# Patient Record
Sex: MALE | Race: BLACK OR AFRICAN AMERICAN | NOT HISPANIC OR LATINO | Employment: OTHER | URBAN - METROPOLITAN AREA
[De-identification: names, ages, dates, MRNs, and addresses within clinical notes are randomized per-mention and may not be internally consistent; named-entity substitution may affect disease eponyms.]

---

## 2024-05-21 ENCOUNTER — CLINICAL SUPPORT (OUTPATIENT)
Dept: RESPIRATORY THERAPY | Facility: HOSPITAL | Age: 64
End: 2024-05-21
Attending: NURSE PRACTITIONER
Payer: MEDICARE

## 2024-05-21 ENCOUNTER — HOSPITAL ENCOUNTER (OUTPATIENT)
Dept: RADIOLOGY | Facility: HOSPITAL | Age: 64
Discharge: HOME | End: 2024-05-21
Attending: NURSE PRACTITIONER
Payer: MEDICARE

## 2024-05-21 DIAGNOSIS — Z13.6 SCREENING FOR CARDIOVASCULAR CONDITION: ICD-10-CM

## 2024-05-21 DIAGNOSIS — Z13.6 SCREENING FOR CARDIOVASCULAR CONDITION: Primary | ICD-10-CM

## 2024-05-21 DIAGNOSIS — Z72.0 TOBACCO ABUSE: ICD-10-CM

## 2024-05-21 DIAGNOSIS — R09.89 DECREASED DORSALIS PEDIS PULSE: Primary | ICD-10-CM

## 2024-05-21 LAB
OHS QRS DURATION: 84 MS
OHS QTC CALCULATION: 425 MS

## 2024-05-21 PROCEDURE — 71046 X-RAY EXAM CHEST 2 VIEWS: CPT | Mod: TC

## 2024-05-21 PROCEDURE — 93005 ELECTROCARDIOGRAM TRACING: CPT

## 2024-05-21 PROCEDURE — 93010 ELECTROCARDIOGRAM REPORT: CPT | Mod: ,,, | Performed by: INTERNAL MEDICINE

## 2024-05-22 ENCOUNTER — HOSPITAL ENCOUNTER (OUTPATIENT)
Dept: RADIOLOGY | Facility: HOSPITAL | Age: 64
Discharge: HOME | End: 2024-05-22
Attending: NURSE PRACTITIONER
Payer: MEDICARE

## 2024-05-22 DIAGNOSIS — R09.89 DECREASED DORSALIS PEDIS PULSE: ICD-10-CM

## 2024-05-22 PROCEDURE — 93925 LOWER EXTREMITY STUDY: CPT | Mod: TC

## 2024-05-31 ENCOUNTER — LAB REQUISITION (OUTPATIENT)
Dept: LAB | Facility: HOSPITAL | Age: 64
End: 2024-05-31
Payer: MEDICARE

## 2024-05-31 DIAGNOSIS — R97.20 ELEVATED PROSTATE SPECIFIC ANTIGEN (PSA): ICD-10-CM

## 2024-05-31 LAB — PSA SERPL-MCNC: 85.45 NG/ML

## 2024-05-31 PROCEDURE — 84153 ASSAY OF PSA TOTAL: CPT

## 2024-08-30 ENCOUNTER — HOSPITAL ENCOUNTER (EMERGENCY)
Facility: HOSPITAL | Age: 64
Discharge: HOME | End: 2024-08-30
Attending: EMERGENCY MEDICINE
Payer: MEDICARE

## 2024-08-30 VITALS
HEIGHT: 70 IN | TEMPERATURE: 97 F | OXYGEN SATURATION: 95 % | BODY MASS INDEX: 16.46 KG/M2 | DIASTOLIC BLOOD PRESSURE: 82 MMHG | SYSTOLIC BLOOD PRESSURE: 137 MMHG | WEIGHT: 115 LBS | HEART RATE: 70 BPM | RESPIRATION RATE: 16 BRPM

## 2024-08-30 DIAGNOSIS — L81.9 DISCOLORATION OF SKIN OF TOE: ICD-10-CM

## 2024-08-30 DIAGNOSIS — M79.606 LEG PAIN: ICD-10-CM

## 2024-08-30 DIAGNOSIS — I73.9 PAD (PERIPHERAL ARTERY DISEASE): Primary | ICD-10-CM

## 2024-08-30 LAB
ALBUMIN SERPL-MCNC: 3.5 G/DL (ref 3.4–4.8)
ALBUMIN/GLOB SERPL: 0.9 RATIO (ref 1.1–2)
ALP SERPL-CCNC: 103 UNIT/L (ref 40–150)
ALT SERPL-CCNC: 11 UNIT/L (ref 0–55)
ANION GAP SERPL CALC-SCNC: 8 MEQ/L
APTT PPP: 29.5 SECONDS (ref 23.2–33.7)
AST SERPL-CCNC: 24 UNIT/L (ref 5–34)
BASOPHILS # BLD AUTO: 0.09 X10(3)/MCL
BASOPHILS NFR BLD AUTO: 1.2 %
BILIRUB SERPL-MCNC: 0.3 MG/DL
BUN SERPL-MCNC: 4.6 MG/DL (ref 8.4–25.7)
CALCIUM SERPL-MCNC: 8.8 MG/DL (ref 8.8–10)
CHLORIDE SERPL-SCNC: 99 MMOL/L (ref 98–107)
CO2 SERPL-SCNC: 22 MMOL/L (ref 23–31)
CREAT SERPL-MCNC: 0.86 MG/DL (ref 0.73–1.18)
CREAT/UREA NIT SERPL: 5
EOSINOPHIL # BLD AUTO: 0.6 X10(3)/MCL (ref 0–0.9)
EOSINOPHIL NFR BLD AUTO: 7.9 %
ERYTHROCYTE [DISTWIDTH] IN BLOOD BY AUTOMATED COUNT: 14.1 % (ref 11.5–17)
GFR SERPLBLD CREATININE-BSD FMLA CKD-EPI: >60 ML/MIN/1.73/M2
GLOBULIN SER-MCNC: 3.7 GM/DL (ref 2.4–3.5)
GLUCOSE SERPL-MCNC: 58 MG/DL (ref 82–115)
HCT VFR BLD AUTO: 39.3 % (ref 42–52)
HGB BLD-MCNC: 13.3 G/DL (ref 14–18)
IMM GRANULOCYTES # BLD AUTO: 0.05 X10(3)/MCL (ref 0–0.04)
IMM GRANULOCYTES NFR BLD AUTO: 0.7 %
INR PPP: 1
LYMPHOCYTES # BLD AUTO: 2.28 X10(3)/MCL (ref 0.6–4.6)
LYMPHOCYTES NFR BLD AUTO: 30.1 %
MCH RBC QN AUTO: 27.3 PG (ref 27–31)
MCHC RBC AUTO-ENTMCNC: 33.8 G/DL (ref 33–36)
MCV RBC AUTO: 80.7 FL (ref 80–94)
MONOCYTES # BLD AUTO: 0.97 X10(3)/MCL (ref 0.1–1.3)
MONOCYTES NFR BLD AUTO: 12.8 %
NEUTROPHILS # BLD AUTO: 3.58 X10(3)/MCL (ref 2.1–9.2)
NEUTROPHILS NFR BLD AUTO: 47.3 %
NRBC BLD AUTO-RTO: 0 %
PLATELET # BLD AUTO: 309 X10(3)/MCL (ref 130–400)
PMV BLD AUTO: 9 FL (ref 7.4–10.4)
POCT GLUCOSE: 80 MG/DL (ref 70–110)
POTASSIUM SERPL-SCNC: 3.9 MMOL/L (ref 3.5–5.1)
PROT SERPL-MCNC: 7.2 GM/DL (ref 5.8–7.6)
PROTHROMBIN TIME: 13 SECONDS (ref 12.5–14.5)
RBC # BLD AUTO: 4.87 X10(6)/MCL (ref 4.7–6.1)
SODIUM SERPL-SCNC: 129 MMOL/L (ref 136–145)
WBC # BLD AUTO: 7.57 X10(3)/MCL (ref 4.5–11.5)

## 2024-08-30 PROCEDURE — 25500020 PHARM REV CODE 255: Performed by: PHYSICIAN ASSISTANT

## 2024-08-30 PROCEDURE — 85730 THROMBOPLASTIN TIME PARTIAL: CPT | Performed by: NURSE PRACTITIONER

## 2024-08-30 PROCEDURE — 85025 COMPLETE CBC W/AUTO DIFF WBC: CPT | Performed by: NURSE PRACTITIONER

## 2024-08-30 PROCEDURE — 80053 COMPREHEN METABOLIC PANEL: CPT | Performed by: NURSE PRACTITIONER

## 2024-08-30 PROCEDURE — 85610 PROTHROMBIN TIME: CPT | Performed by: NURSE PRACTITIONER

## 2024-08-30 PROCEDURE — 82962 GLUCOSE BLOOD TEST: CPT

## 2024-08-30 PROCEDURE — 99285 EMERGENCY DEPT VISIT HI MDM: CPT | Mod: 25

## 2024-08-30 RX ORDER — ASPIRIN 81 MG/1
81 TABLET ORAL DAILY
Qty: 30 TABLET | Refills: 0 | Status: SHIPPED | OUTPATIENT
Start: 2024-08-30 | End: 2024-09-29

## 2024-08-30 RX ORDER — CLOPIDOGREL BISULFATE 75 MG/1
75 TABLET ORAL DAILY
Qty: 30 TABLET | Refills: 0 | Status: SHIPPED | OUTPATIENT
Start: 2024-08-30 | End: 2024-09-29

## 2024-08-30 RX ADMIN — IOHEXOL 100 ML: 350 INJECTION, SOLUTION INTRAVENOUS at 07:08

## 2024-08-30 NOTE — ED NOTES
Lobby Round. Pt sitting in lobby alert and oriented at this time. Family at pt side. Pt denies any changes from intial assessment at this time. Pt does not appear to be in any immediate distress at this time.

## 2024-08-30 NOTE — ED PROVIDER NOTES
Encounter Date: 8/30/2024       History     Chief Complaint   Patient presents with    Foot Pain     POV, R 4th toe pain and discoloration x 1 month, no hx DM. C/o numbness and tingling in bilateral feet.      Patient presents with:  Foot Pain: POV, R 4th toe pain and discoloration x 1 month, no hx DM. C/o numbness and tingling in bilateral feet.           Leg Pain   There was no injury mechanism. The incident occurred several weeks ago. The pain is present in the right foot. Pertinent negatives include no numbness, no inability to bear weight, no loss of motion, no muscle weakness, no loss of sensation and no tingling. He reports no foreign bodies present. Nothing aggravates the symptoms. He has tried nothing for the symptoms.     Review of patient's allergies indicates:  No Known Allergies  No past medical history on file.  No past surgical history on file.  No family history on file.     Review of Systems   Constitutional:  Negative for fever.   HENT:  Negative for sore throat.    Respiratory:  Negative for shortness of breath.    Cardiovascular:  Negative for chest pain.   Gastrointestinal:  Negative for nausea.   Genitourinary:  Negative for dysuria.   Musculoskeletal:  Negative for back pain.        Right foot 4th toe pain and discoloration.  For 1 month.  He is asymptomatic, just discoloration there is no tenderness to touch.   Skin:  Negative for rash.   Neurological:  Negative for tingling, weakness and numbness.   Hematological:  Does not bruise/bleed easily.       Physical Exam     Initial Vitals [08/30/24 1348]   BP Pulse Resp Temp SpO2   129/77 71 17 97.4 °F (36.3 °C) 100 %      MAP       --         Physical Exam    Vitals reviewed.  Constitutional: He appears well-developed and well-nourished.   Cardiovascular:  Normal rate.           Pulmonary/Chest: Breath sounds normal.   Musculoskeletal:      Right lower leg: Normal.      Right foot: Normal range of motion and normal capillary refill. No swelling,  Charcot foot, foot drop, laceration or tenderness. Normal pulse.      Comments: Positive for discoloration of the right foot 4th toe.  No tingling no numbness no tender to palpation.     Neurological: He is alert and oriented to person, place, and time.   Skin: Skin is warm.   Psychiatric: He has a normal mood and affect. Thought content normal.         ED Course   Procedures  Labs Reviewed   COMPREHENSIVE METABOLIC PANEL - Abnormal       Result Value    Sodium 129 (*)     Potassium 3.9      Chloride 99      CO2 22 (*)     Glucose 58 (*)     Blood Urea Nitrogen 4.6 (*)     Creatinine 0.86      Calcium 8.8      Protein Total 7.2      Albumin 3.5      Globulin 3.7 (*)     Albumin/Globulin Ratio 0.9 (*)     Bilirubin Total 0.3            ALT 11      AST 24      eGFR >60      Anion Gap 8.0      BUN/Creatinine Ratio 5     CBC WITH DIFFERENTIAL - Abnormal    WBC 7.57      RBC 4.87      Hgb 13.3 (*)     Hct 39.3 (*)     MCV 80.7      MCH 27.3      MCHC 33.8      RDW 14.1      Platelet 309      MPV 9.0      Neut % 47.3      Lymph % 30.1      Mono % 12.8      Eos % 7.9      Basophil % 1.2      Lymph # 2.28      Neut # 3.58      Mono # 0.97      Eos # 0.60      Baso # 0.09      IG# 0.05 (*)     IG% 0.7      NRBC% 0.0     APTT - Normal    PTT 29.5     PROTIME-INR - Normal    PT 13.0      INR 1.0     CBC W/ AUTO DIFFERENTIAL    Narrative:     The following orders were created for panel order CBC Auto Differential.  Procedure                               Abnormality         Status                     ---------                               -----------         ------                     CBC with Differential[[id number]]       Abnormal            Final result                 Please view results for these tests on the individual orders.   POCT GLUCOSE    POCT Glucose 80            Imaging Results              CTA Lower Extremity (Final result)  Result time 08/30/24 20:17:21      Final result by Chance Muñoz MD (08/30/24  20:17:21)                   Impression:      Severe atherosclerotic disease with occlusion mid to distal right superficial femoral artery.  There is popliteal reconstitution and ultimately three-vessel runoff.      Electronically signed by: Chance Muñoz  Date:    08/30/2024  Time:    20:17               Narrative:    EXAMINATION:  CTA LOWER EXTREMITY    CLINICAL HISTORY:  Peripheral arterial disease (PAD), asymptomatic;PAD with necrosis of the right toe 4th digit..;    TECHNIQUE:  Helical acquisition through the right hemipelvis and right lower extremity without and with IV contrast targeting the arterial phase. 3D MIP reconstructions made available for review.  The DLP is 1440.  Automatic exposure control, adjustment of mA/kV or iterative reconstruction technique was used to reduce radiation.    COMPARISON:  None    FINDINGS:  Vasculature:    Common iliac arteries incompletely imaged.  There is dense iliac atherosclerotic disease with severe narrowing of the external iliac.  Moderate common femoral narrowing.  Deep femoral widely patent.  The superficial femoral artery is occluded just distal to its origin.  There is reconstitution of the popliteal with moderate narrowing.  Severe trifurcation disease with multifocal high-grade stenoses and occlusions particularly posterior tibial artery but ultimately all 3 vessels are opacified at the ankle on delayed imaging.    Other Findings:    Within the pelvis there is no free fluid or mass.  The prostate is moderately enlarged.  There are no acute osseous findings.                                       Medications   iohexoL (OMNIPAQUE 350) injection 100 mL (100 mLs Intravenous Given 8/30/24 1936)     Medical Decision Making  Patient presents with:  Foot Pain: POV, R 4th toe pain and discoloration x 1 month, no hx DM. C/o numbness and tingling in bilateral feet.         Amount and/or Complexity of Data Reviewed  Labs: ordered. Decision-making details documented in ED  Course.     Details: CBC, CMP, INR PTT normal ranges no acute finding.  Radiology: ordered.     Details: The right  lower extremity ankle brachial index was .66 suggesting moderate arterial obstructive disease.     The right lower extremity demonstrated severe arterial flow reduction consistent  with no flow detected in the mid superficial femoral artery. Reconstitution of flow was identified in the distal superficial femoral artery with blunted, monophasic waveforms in the tibial arteries. The right lower extremity's arterial system demonstrated diffuse plaque and highly calcified arteries throughout.         Risk  OTC drugs.  Prescription drug management.  Risk Details: I discussed case with Dr. [facility], recommendation is to start ASA, plavix , Dr. De will follow up with patient outpatient.  Specific instructions provided to patient to return if symptoms worsened.    I discussed case with Celso  with [facility], recommendation to evaluate patient with hospitalist/vascular.  Celso NP , states if needed to contact [facility] for peripheral arterial disease treatment.     I personally discussed finding with patient patient voiced understanding he will be following up with Dr. De  outpatient.                     ED Course as of 08/30/24 2241   Fri Aug 30, 2024   1710 POCT Glucose: 80 [YG]   1710 INR: 1.0 [YG]   1710 PT: 13.0 [YG]   1710 CO2(!): 22 [YG]   1710 Hemoglobin(!): 13.3 [YG]   1710 Hematocrit(!): 39.3 [YG]      ED Course User Index  [YG] Ashley Dickinson PA          Judging by the patient's chief complaint and pertinent history, the patient has the following possible differential diagnoses, including but not limited to the following.  Some of these are deemed to be lower likelihood and some more likely based on my physical exam and history combined with possible lab work and/or imaging studies.   Please see the pertinent studies, and refer to the HPI.  Some of these diagnoses will take further  evaluation to fully rule out, perhaps as an outpatient and the patient was encouraged to follow up when discharged for more comprehensive evaluation.    Peripheral vascular arterial disease, DVT,            The patient is resting comfortably in no acute distress.  He is hemodynamically stable and is without objective evidence for acute process requiring urgent intervention or hospitalization. I provided counseling to patient with regard to condition, the treatment plan, specific conditions for return, and the importance of follow up. Detailed written and verbal instructions provided to patient and he expressed a verbal understanding of the discharge instructions and overall management plan. Reiterated the importance of medication administration and safety and advised patient to follow up with primary care provider in 3-5 days or sooner if needed.  Answered questions at this time. The patient is stable for discharge.            Clinical Impression:  Final diagnoses:  [M79.606] Leg pain  [L81.9] Discoloration of skin of toe  [I73.9] PAD (peripheral artery disease) (Primary)          ED Disposition Condition    Discharge Stable          ED Prescriptions       Medication Sig Dispense Start Date End Date Auth. Provider    clopidogreL (PLAVIX) 75 mg tablet Take 1 tablet (75 mg total) by mouth once daily. 30 tablet 8/30/2024 9/29/2024 Ashley Dickinson PA    aspirin (ECOTRIN) 81 MG EC tablet Take 1 tablet (81 mg total) by mouth once daily. 30 tablet 8/30/2024 9/29/2024 Ashley Dickinson PA          Follow-up Information    None          Ashley Dickinson PA  08/30/24 2232       Ashley Dickinson PA  08/30/24 2242

## 2024-08-30 NOTE — FIRST PROVIDER EVALUATION
Medical screening examination initiated.  I have conducted a focused provider triage encounter, findings are as follows:    Brief history of present illness:  Patient states pain and discoloration to the toes of his right foot.     There were no vitals filed for this visit.    Pertinent physical exam:  Awake, alert, ambulatory      Brief workup plan:  Labs, Imaging    Preliminary workup initiated; this workup will be continued and followed by the physician or advanced practice provider that is assigned to the patient when roomed.

## 2024-08-31 LAB
LEFT ABI: 0.68
LEFT CFA PSV: 60 CM/S
LEFT DORSALIS PEDIS: 63 MMHG
LEFT POSTERIOR TIBIAL: 98 MMHG
RIGHT ABI: 0.66
RIGHT ARM BP: 145 MMHG
RIGHT CFA PSV: 171 CM/S
RIGHT DORSALIS PEDIS PSV: 18 CM/S
RIGHT DORSALIS PEDIS: 96 MMHG
RIGHT POPLITEAL PSV: 55 CM/S
RIGHT POST TIBIAL SYS PSV: 13 CM/S
RIGHT POSTERIOR TIBIAL: 91 MMHG
RIGHT PROFUNDA SYS PSV: 205 CM/S
RIGHT SUPER FEMORAL DIST SYS PSV: 53 CM/S
RIGHT SUPER FEMORAL MID SYS PSV: 0 CM/S
RIGHT SUPER FEMORAL PROX SYS PSV: 121 CM/S

## 2024-09-19 RX ORDER — TAMSULOSIN HYDROCHLORIDE 0.4 MG/1
1 CAPSULE ORAL
COMMUNITY
Start: 2024-08-29

## 2024-09-19 RX ORDER — MELOXICAM 15 MG/1
TABLET ORAL
COMMUNITY

## 2024-09-19 RX ORDER — ERGOCALCIFEROL 1.25 MG/1
1 CAPSULE ORAL
COMMUNITY

## 2024-09-19 RX ORDER — DULOXETIN HYDROCHLORIDE 30 MG/1
1 CAPSULE, DELAYED RELEASE ORAL
COMMUNITY
Start: 2024-06-06

## 2024-09-19 RX ORDER — TOBRAMYCIN AND DEXAMETHASONE 3; 1 MG/ML; MG/ML
SUSPENSION/ DROPS OPHTHALMIC
COMMUNITY
Start: 2024-06-14

## 2024-09-26 NOTE — PROGRESS NOTES
"    [facility] - Clinic Note  Amy De MD      Patient Name: Angel Miller                   : 1960      MRN: [id number]   Visit Date: 10/1/2024       History Present Illness     Reason for Visit: Arterial Disease    Mr. Miller presents to the clinic for evaluation of right foot pain. He was evaluated in the ED 24 due to his pain with associated discoloration to his 4th toe. Lower extremity testing, including CTA was obtained at that time. He additionally had arterial ultrasound ordered by his PCP in May due to decreased pulses. He reports his leg pain affects his walking at long distances. He has to rest at 2 blocks to recover. He states there is a park 3 blocks away and he cannot get there. He feels his discomfort through his calf and foot on both legs - right is worse than left.     He is a current smoker. He denies recreational drug use.       REVIEW OF SYSTEMS:  Review of systems conducted, negative except as stated in the history of present illness. See HPI for details.        Physical Exam      Vitals:    10/01/24 1417 10/01/24 1419   BP: 121/79 110/72   BP Location: Right arm Left arm   Patient Position: Sitting Sitting   Pulse: 79 76   Weight: 55.3 kg (122 lb)    Height: 5' 10" (1.778 m)           General: well-nourished, no acute distress, and healthy appearing, ambulating normally, alert, pleasant, conversant, and oriented  Neurologic: cranial nerves are grossly intact, no neurologic deficits, no motor deficits, and no sensory deficits  HEENT: grossly normal and no scleral icterus  Neck/Chest: normal  and soft without lymphadenopathy  Respiratory: breathing easily and without respiratory distress  Abdomen: normal, soft, and protuberant  Cardiology: regular rate and rhythm    Upper Extremity Arterial Exam:   Right - radial is palpable  Left - radial is palpable    Lower Extremity Arterial Exam:  Right - posterior tibial is monophasic with doppler and dorsalis pedis is " monophasic with doppler  Left - posterior tibial is monophasic with doppler and dorsalis pedis is absent    Musculoskeletal:   Lower Extremity: no edema present to bilateral lower extremities    Skin: has no obvious skin abnormality to lower extremities; no ulcerations; resolving discoloration to the 4th right toe              Assessment and Plan     Mr. Miller is a 63 y.o. with right leg claudication and previous right blue toe syndrome.  The right discoloration and symptoms to the right fourth toe are resolving. Arterial duplex obtained in August demonstrates monophasic waveforms through the right leg with COOKIE of 0.66. Left ankle COOKIE is 0.68. CTA was obtained of his right lower extremity demonstrating left common femoral artery plaque with right superficial femoral artery occlusion (iliac vessels are not appropriately imaged).    We discussed options for treatment as well as the basics of atherosclerotic disease. He is a short distance claudicant without critical limb ischemia - I encouraged initiation and maintenance of a walking program as this could assist in increasing walking distances at low risk. Additionally prescribed Pletal. OK to discontinue Plavix therapy.     Recommended devoting 45 minutes per day, 5 times per week to ambulation. During this time, patient is to be solely focused on ambulation. Walk until discomfort necessitates a break. Once symptoms have resolved, resume ambulation. Continue this for entirety of 45-minute period. Encouraged patient to maintain a log for ambulation distances: (1) distance of ambulation before a break is needed and (2) overall distance covered in 45 minutes. Education sheet provided to him today.    Will have him back in 2-3 months to assess progression with symptoms.  If he hasn't had significant improvement, we will consider procedural intervention.         1. Atherosclerosis of native artery of both lower extremities with intermittent claudication    2. Leg pain  -  Ambulatory referral/consult to Vascular Surgery    3. Discoloration of skin of toe  - Ambulatory referral/consult to Vascular Surgery    4. PAD (peripheral artery disease)  - Ambulatory referral/consult to Vascular Surgery          Imaging Obtained/Reviewed   Study: ankle brachial index and right lower extremity arterial duplex  Date:   24    Study: CTA Lower Extremity  Date: 24        Medical History     Past Medical History:   Diagnosis Date    Anxiety     COPD (chronic obstructive pulmonary disease)     Depression     Leg pain     PAD (peripheral artery disease)     Spondylosis      History reviewed. No pertinent surgical history.  No family history on file.  Social History     Socioeconomic History    Marital status: Single   Tobacco Use    Smoking status: Every Day     Current packs/day: 1.00     Types: Cigarettes    Smokeless tobacco: Never     Current Outpatient Medications   Medication Instructions    aspirin (ECOTRIN) 81 mg, Oral, Daily    atorvastatin (LIPITOR) 40 MG tablet Lipitor 40 mg tablet, [RxNorm: 929423]    clopidogreL (PLAVIX) 75 mg, Oral, Daily    DULoxetine (CYMBALTA) 30 MG capsule 1 capsule    ergocalciferol (ERGOCALCIFEROL) 50,000 unit Cap 1 capsule    meloxicam (MOBIC) 15 MG tablet 1 tablet Orally Once a day for 90 days    sildenafiL (VIAGRA) 50 MG tablet SMARTSI-2 Tablet(s) By Mouth Every Other Day    tadalafiL (CIALIS) 10 MG tablet SMARTSI-2 Tablet(s) By Mouth Every Other Day PRN    tamsulosin (FLOMAX) 0.4 mg Cap 1 capsule    tobramycin-dexAMETHasone 0.3-0.1% (TOBRADEX) 0.3-0.1 % DrpS Place into both eyes.     Review of patient's allergies indicates:  No Known Allergies    Patient Care Team:  Beck Newberry NP as PCP - General (Family Medicine)  Ashley Dickinson PA as Physician Assistant (Emergency Medicine)  Jono Interiano MD as Consulting Physician (Otolaryngology)  Jono Cox MD as Consulting Physician (Urology)        No follow-ups on file. In addition to  their scheduled follow up, the patient has also been instructed to follow up on as needed basis.     Future Appointments   Date Time Provider Department Center   10/9/2024  8:00 AM [facility] [address] [place] [address] [place]

## 2024-10-01 ENCOUNTER — OFFICE VISIT (OUTPATIENT)
Dept: VASCULAR SURGERY | Facility: CLINIC | Age: 64
End: 2024-10-01
Payer: MEDICARE

## 2024-10-01 VITALS
SYSTOLIC BLOOD PRESSURE: 110 MMHG | BODY MASS INDEX: 17.47 KG/M2 | DIASTOLIC BLOOD PRESSURE: 72 MMHG | HEART RATE: 76 BPM | HEIGHT: 70 IN | WEIGHT: 122 LBS

## 2024-10-01 DIAGNOSIS — I70.213 ATHEROSCLEROSIS OF NATIVE ARTERY OF BOTH LOWER EXTREMITIES WITH INTERMITTENT CLAUDICATION: Primary | ICD-10-CM

## 2024-10-01 DIAGNOSIS — I73.9 PAD (PERIPHERAL ARTERY DISEASE): ICD-10-CM

## 2024-10-01 DIAGNOSIS — M79.604 PAIN IN BOTH LOWER EXTREMITIES: ICD-10-CM

## 2024-10-01 DIAGNOSIS — L81.9 DISCOLORATION OF SKIN OF TOE: ICD-10-CM

## 2024-10-01 DIAGNOSIS — I70.211 ATHEROSCLEROSIS OF NATIVE ARTERY OF RIGHT LOWER EXTREMITY WITH INTERMITTENT CLAUDICATION: ICD-10-CM

## 2024-10-01 DIAGNOSIS — M79.605 PAIN IN BOTH LOWER EXTREMITIES: ICD-10-CM

## 2024-10-01 PROCEDURE — 1159F MED LIST DOCD IN RCRD: CPT | Mod: CPTII,,, | Performed by: SPECIALIST

## 2024-10-01 PROCEDURE — 1101F PT FALLS ASSESS-DOCD LE1/YR: CPT | Mod: CPTII,,, | Performed by: SPECIALIST

## 2024-10-01 PROCEDURE — 1160F RVW MEDS BY RX/DR IN RCRD: CPT | Mod: CPTII,,, | Performed by: SPECIALIST

## 2024-10-01 PROCEDURE — 3078F DIAST BP <80 MM HG: CPT | Mod: CPTII,,, | Performed by: SPECIALIST

## 2024-10-01 PROCEDURE — 3288F FALL RISK ASSESSMENT DOCD: CPT | Mod: CPTII,,, | Performed by: SPECIALIST

## 2024-10-01 PROCEDURE — 3008F BODY MASS INDEX DOCD: CPT | Mod: CPTII,,, | Performed by: SPECIALIST

## 2024-10-01 PROCEDURE — 3074F SYST BP LT 130 MM HG: CPT | Mod: CPTII,,, | Performed by: SPECIALIST

## 2024-10-01 PROCEDURE — 99203 OFFICE O/P NEW LOW 30 MIN: CPT | Mod: ,,, | Performed by: SPECIALIST

## 2024-10-01 RX ORDER — CILOSTAZOL 50 MG/1
100 TABLET ORAL 2 TIMES DAILY
Qty: 180 TABLET | Refills: 1 | Status: SHIPPED | OUTPATIENT
Start: 2024-10-01 | End: 2024-12-30

## 2024-10-01 RX ORDER — ATORVASTATIN CALCIUM 40 MG/1
TABLET, FILM COATED ORAL
COMMUNITY
Start: 2024-09-18

## 2024-10-01 RX ORDER — SILDENAFIL 50 MG/1
TABLET, FILM COATED ORAL
COMMUNITY
Start: 2024-08-29

## 2024-10-01 RX ORDER — TADALAFIL 10 MG/1
TABLET ORAL
COMMUNITY
Start: 2024-06-03

## 2025-02-07 ENCOUNTER — TELEPHONE (OUTPATIENT)
Dept: VASCULAR SURGERY | Facility: CLINIC | Age: 65
End: 2025-02-07
Payer: MEDICARE

## 2025-02-12 DIAGNOSIS — C61 PROSTATIC CANCER: Primary | ICD-10-CM

## 2025-08-20 DIAGNOSIS — Z87.891 PERSONAL HISTORY OF TOBACCO USE, PRESENTING HAZARDS TO HEALTH: Primary | ICD-10-CM
